# Patient Record
Sex: MALE | Race: BLACK OR AFRICAN AMERICAN | NOT HISPANIC OR LATINO | Employment: UNEMPLOYED | URBAN - METROPOLITAN AREA
[De-identification: names, ages, dates, MRNs, and addresses within clinical notes are randomized per-mention and may not be internally consistent; named-entity substitution may affect disease eponyms.]

---

## 2020-05-26 DIAGNOSIS — Z20.822 SUSPECTED 2019 NOVEL CORONAVIRUS INFECTION: Primary | ICD-10-CM

## 2025-05-06 DIAGNOSIS — M54.16 LUMBAR RADICULOPATHY: Primary | ICD-10-CM

## 2025-06-25 ENCOUNTER — TELEPHONE (OUTPATIENT)
Facility: CLINIC | Age: 65
End: 2025-06-25
Payer: MEDICAID

## 2025-06-25 NOTE — TELEPHONE ENCOUNTER
New Patient Visit Phone Call Prior to visit    Patient is being referred to Dr. Cody Martin and is scheduled on 06/25/25 at 3:30 .    Patient Information:  Referred by: Dr. Radha Ignacio  Reason for referral: M54.16 (ICD-10-CM) - Lumbar radiculopathy  Is this accident or work related injury? Not pertinent  Is there an  involved? Not pertinent       Medical and Surgical History:  PMH:  has a past medical history of GERD (gastroesophageal reflux disease), HTN (hypertension), Mixed hyperlipidemia, and Unspecified osteoarthritis, unspecified site.   PSH:  has a past surgical history that includes Hernia repair (2014) and Right Knee Surgery (2008).  Allergies:Review of patient's allergies indicates:  No Known Allergies      Previous Treatments:    Non-Medication Measures in last 1 year  In last one year, patient attempted the following non-medication physician directed conservative measures Physician directed Physical therapy for current presentation with mild benefit   Physician directed PT/OT/Chiropractic for current complaint: Patient has not previously completed physician directed physical therapy for current presentation.  and Patient has previously completed physical therapy for current presentation in year 2024 to a total of 1 sessions at [facility]. Patient found mild improvement for pain and mild improvement in functionality for current presentation.     Medications  Current Pain Medications: Opioids: Hydrocodone (Norco) - also taking Robaxin, Meloxicam, & Gabapentin.  Antiplatelets/Anticoagulants: not on any antiplatelet agents and not on any anticoagulants  PCP: Dr. Radha Casey  Cardiologist: Not pertinent    Interventional Procedures  Injections with other providers: Yes and pertinent for can't remember what or with who  Surgery of the area of pain: Not pertinent  Other: Not pertinent    What do you think helps most with pain in the 6 months to 1  year: Heat pad and Opioid Pain Medications      Communications  Check-in time at  30 minutes prior to the visit time or may delay initial evaluation  Visit will include pain assessment, physical examination (so please come in easy clothing, shoes and socks that can easily come off), and treatment plans.   If patient has answered yes to pain medications I.e. opioids for therapies that help with pain above, please communicate that Dr. Martin, doesn't offer pain medications like oxycodone typically unless cancer pain.     - Feels that Norcos are the only thing helping him at this moment. He states that he was hoping Dr. Martin could  where Dr. Ignacio left off and prescribe him Norcos before he leaves. I reiterated that he would not be able to do that beings he does not give opioids. Pt states that he doesn't know if this appointment is going to work for him beings he is driving from [place] and is not receiving anything to help him. I gave him the option to cancel his appointment, he told me that he does not know what he wants to do and will consult with his doctor before proceeding.